# Patient Record
(demographics unavailable — no encounter records)

---

## 2024-12-17 NOTE — PHYSICAL EXAM
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

## 2024-12-17 NOTE — PLAN
Medication: Rosuvastatin passed protocol.   Last office visit date: 3/18/24  Next appointment scheduled?: No   Number of refills given: 3   [TextEntry] : - Trial gabapentin 100 mg qhs, increasing to BID then TID as tolerated - F/u with Dr. Higgins for repeat Rt trochanteric bursa injection and consideration of repeat YVOANI.  - C/w conservative treatment including PT, acupuncture, massage, water aerobics - F/u in 6 months when they return from [place].  Call the office with any questions, concerns, or worsening symptoms. Plan was discussed with patient whom expressed understanding and agreed with plan. Loss

## 2024-12-17 NOTE — PLAN
[TextEntry] : - Trial gabapentin 100 mg qhs, increasing to BID then TID as tolerated - F/u with Dr. Higgins for repeat Rt trochanteric bursa injection and consideration of repeat YOVANI.  - C/w conservative treatment including PT, acupuncture, massage, water aerobics - F/u in 6 months when they return from [place].  Call the office with any questions, concerns, or worsening symptoms. Plan was discussed with patient whom expressed understanding and agreed with plan.

## 2024-12-17 NOTE — HISTORY OF PRESENT ILLNESS
[de-identified] : 75F presents for f/u regarding back pain. She has had YOVANI x 2 and a Rt trochanteric bursa injection with Dr. Higgins since her last visit. She has also trialed 300 mg of gabapentin, lidocaine patches, acupuncture and a stretch class at [employer]. Pt reports relief x 2.5 mos with first YOVANI. She had her second YOVANI last Tuesday. She reports relief with her Rt trochanteric bursa injection. She has a repeat bursa injection scheduled. Pt trailed 300 mg of gabapentin but experienced dizziness with one dose. She also did a stretch class at [employer] which she was unable to tolerate. She has relief with lidocaine patches and acupuncture. She will be going to [place] soon, returning in May.

## 2024-12-17 NOTE — PHYSICAL EXAM
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

## 2024-12-17 NOTE — ASSESSMENT
[FreeTextEntry1] : 75F with degenerative scoliosis and multilevel neuroforaminal stenosis with lower back pain and Rt hip pain with improvement following YOVANI x 2 and Rt trochanteric bursa injection.

## 2024-12-17 NOTE — HISTORY OF PRESENT ILLNESS
[de-identified] : 75F presents for f/u regarding back pain. She has had YOVANI x 2 and a Rt trochanteric bursa injection with Dr. Higgins since her last visit. She has also trialed 300 mg of gabapentin, lidocaine patches, acupuncture and a stretch class at [facility]. Pt reports relief x 2.5 mos with first YOVANI. She had her second YOVANI last Tuesday. She reports relief with her Rt trochanteric bursa injection. She has a repeat bursa injection scheduled. Pt trailed 300 mg of gabapentin but experienced dizziness with one dose. She also did a stretch class at [facility] which she was unable to tolerate. She has relief with lidocaine patches and acupuncture. She will be going to [place] soon, returning in May.

## 2025-04-16 NOTE — PLAN
[TextEntry] : She will do a trial of this. She will also continue with plans for a right-sided trochanteric bursa injection. If her symptoms are recalcitrant, she will be referred for an MRI of the entire spine, CAT scan of the lumbar spine, scoliosis x-rays of the entire spine, and dynamic x-rays of the lumbar spine prior to consideration for their treatment, which may or may not include Facet Blocks, repeat epidural steroid injection, vs a large reconstructive operation. She is anxious to avoid this at this juncture, as it would likely include a T10 to the pelvis, decompression, and fusion with a 5-1 T-LIFT. She will follow up with us as needed.

## 2025-04-16 NOTE — HISTORY OF PRESENT ILLNESS
[de-identified] : Phyllis Faulkner has deteriorated. She is spending much time in bed. When she was in [place], she was doing pool exercises and she was up to 900 mg of gabapentin. Though this helped her pain, it made her feel fuzzy, and out of it, and she has since stopped taking it. She is anxious to try a gummy that she saw on [employer] that has claims of pain relief in patients with arthritic issues.

## 2025-04-16 NOTE — HISTORY OF PRESENT ILLNESS
[de-identified] : Phyllis Faulkner has deteriorated. She is spending much time in bed. When she was in [place], she was doing pool exercises and she was up to 900 mg of gabapentin. Though this helped her pain, it made her feel fuzzy, and out of it, and she has since stopped taking it. She is anxious to try a gummy that she saw on [employer] that has claims of pain relief in patients with arthritic issues.

## 2025-04-16 NOTE — END OF VISIT
[FreeTextEntry3] : Documented by Veronica Michaud acting as a scribe for Fran Mathew on 04/15/2025   All medical record entries made by the Scribe were at my, Dr. Fran Mathew direction and personally dictated by me on 04/15/2025 . I have reviewed the chart and agree that the record accurately reflects my personal performance of the history, physical exam, assessment and plan. I have also personally directed, reviewed, and agreed with the chart.

## 2025-04-16 NOTE — ADDENDUM
[FreeTextEntry1] : I, Veronica Michaud (scribe) assisted in filling out this chart under the dictation of Fran Mathew on 04/15/2025

## 2025-04-16 NOTE — HISTORY OF PRESENT ILLNESS
[de-identified] : Phyllis Faulkner has deteriorated. She is spending much time in bed. When she was in [place], she was doing pool exercises and she was up to 900 mg of gabapentin. Though this helped her pain, it made her feel fuzzy, and out of it, and she has since stopped taking it. She is anxious to try a gummy that she saw on [employer] that has claims of pain relief in patients with arthritic issues.